# Patient Record
Sex: FEMALE | Race: WHITE | Employment: STUDENT | URBAN - METROPOLITAN AREA
[De-identification: names, ages, dates, MRNs, and addresses within clinical notes are randomized per-mention and may not be internally consistent; named-entity substitution may affect disease eponyms.]

---

## 2020-03-30 ENCOUNTER — TRANSFERRED RECORDS (OUTPATIENT)
Dept: HEALTH INFORMATION MANAGEMENT | Facility: CLINIC | Age: 20
End: 2020-03-30

## 2020-03-30 ENCOUNTER — MEDICAL CORRESPONDENCE (OUTPATIENT)
Dept: HEALTH INFORMATION MANAGEMENT | Facility: CLINIC | Age: 20
End: 2020-03-30

## 2021-03-10 ENCOUNTER — TELEPHONE (OUTPATIENT)
Dept: PLASTIC SURGERY | Facility: CLINIC | Age: 21
End: 2021-03-10

## 2021-03-10 DIAGNOSIS — F64.0 GENDER DYSPHORIA IN ADOLESCENT AND ADULT: Primary | ICD-10-CM

## 2021-03-10 NOTE — TELEPHONE ENCOUNTER
[facility] :  Care Coordination Note     SITUATION   Pt (Thiago, they/he) is a 20 year old adult who is receiving support for:  Care Team  .    BACKGROUND     Spoke to pt regarding scheduling consultation for top surgery. Discussed options, scheduled consultation for 10/8/21 with [facility].     ASSESSMENT     Surgery              [facility] Assessment  [facility] ([facility]) Enrollment: Enrolled  Patient has a therapist: Yes  Name of therapist: Ann ? at [facility]  Letter of support #1: Requested  Surgery being considered: Yes  Mastectomy: Yes    Pt reports:    No smoking  No diabetes  HRT since October 2020  No previous gender confirming surgeries  Sees Barb (unsure of last name) at [facility]      PLAN          Nursing Interventions:  [facility] assessment completed    Follow-up plan:    1. Obtain GRETA [place]

## 2021-03-26 ENCOUNTER — MEDICAL CORRESPONDENCE (OUTPATIENT)
Dept: HEALTH INFORMATION MANAGEMENT | Facility: CLINIC | Age: 21
End: 2021-03-26

## 2021-04-03 ENCOUNTER — HEALTH MAINTENANCE LETTER (OUTPATIENT)
Age: 21
End: 2021-04-03

## 2021-04-21 ENCOUNTER — TELEPHONE (OUTPATIENT)
Dept: PLASTIC SURGERY | Facility: CLINIC | Age: 21
End: 2021-04-21

## 2021-04-21 NOTE — TELEPHONE ENCOUNTER
Called pt regarding opening in Dr. Forrest's scheduled for top surgery consult 5/14/21. Pt asked to switch consult date to May date. Pt requested video visit due to being in school in [place].     Zoe [place]  
none

## 2021-04-22 NOTE — TELEPHONE ENCOUNTER
FUTURE VISIT INFORMATION      FUTURE VISIT INFORMATION:    Date: 5/14/21    Time: 9:00am    Location: [facility]  REFERRAL INFORMATION:    Referring provider:  self    Referring providers clinic: N/A    Reason for visit/diagnosis top consult    RECORDS REQUESTED FROM:       No recs to collect

## 2021-05-03 ENCOUNTER — TRANSFERRED RECORDS (OUTPATIENT)
Dept: HEALTH INFORMATION MANAGEMENT | Facility: CLINIC | Age: 21
End: 2021-05-03

## 2021-05-14 ENCOUNTER — PATIENT OUTREACH (OUTPATIENT)
Dept: PLASTIC SURGERY | Facility: CLINIC | Age: 21
End: 2021-05-14

## 2021-05-14 ENCOUNTER — TELEPHONE (OUTPATIENT)
Dept: PLASTIC SURGERY | Facility: CLINIC | Age: 21
End: 2021-05-14

## 2021-05-14 ENCOUNTER — PRE VISIT (OUTPATIENT)
Dept: PLASTIC SURGERY | Facility: CLINIC | Age: 21
End: 2021-05-14

## 2021-05-14 NOTE — PROGRESS NOTES
[facility]:  Care Coordination Note     SITUATION   Patient is a 20 year old who is receiving support for:  Clinic Care Coordination - Follow-up (Received top surgery letter)  .    BACKGROUND     Received top surgery letter from therapist, that is out of date. Therapist emailed requesting updated letter.     ASSESSMENT     Surgery              [facility] Assessment  [facility] ([facility]) Enrollment: Enrolled  Patient has a therapist: Yes  Name of therapist: Barb Dover at [facility] psychotherapy  Letter of support #1: Received  Letter #1 Date: 03/30/20  Surgery being considered: Yes  Mastectomy: Yes          PLAN          Nursing Interventions:   Reviewed letter of support for WPATH standards of care which is NOT adequate, date needs to be updated.     Follow-up plan:  Pt to reschedule consult since they did not  the phone call for their virtual appointment.        Mo Nath

## 2021-05-14 NOTE — TELEPHONE ENCOUNTER
[facility] Call Center    Phone Message    May a detailed message be left on voicemail: yes     Reason for Call: Other: Patient missed appt today, 5/14/21, due to time difference and would like to reschedule back to October 2021.  Please follow up with patient.  Thank you!     Action Taken: Message routed to:  [facility]    Travel Screening: Not Applicable

## 2021-05-17 NOTE — TELEPHONE ENCOUNTER
Called pt back, LVM, to call writer directly. Expressed unlikely able to find an opening in or before October 2021.     CARLOS Siddiqui  Transgender Care Coordinator

## 2021-07-23 ENCOUNTER — TELEPHONE (OUTPATIENT)
Dept: PLASTIC SURGERY | Facility: CLINIC | Age: 21
End: 2021-07-23

## 2021-07-23 NOTE — TELEPHONE ENCOUNTER
Writer called pt regarding Dr. Forrest leaving and appt cancellation. No answer, DOMINIC.     Zoe España

## 2021-08-26 ENCOUNTER — TELEPHONE (OUTPATIENT)
Dept: PLASTIC SURGERY | Facility: CLINIC | Age: 21
End: 2021-08-26

## 2021-08-26 NOTE — TELEPHONE ENCOUNTER
Writer called pt regarding scheduling with Dr. Piedra for top consult, explained to pt that virtual visit would need to be completed in [place]. Pt to check in with family, call back to schedule consult mid-October.     Zoe España

## 2021-09-18 ENCOUNTER — HEALTH MAINTENANCE LETTER (OUTPATIENT)
Age: 21
End: 2021-09-18

## 2022-04-30 ENCOUNTER — HEALTH MAINTENANCE LETTER (OUTPATIENT)
Age: 22
End: 2022-04-30

## 2022-11-20 ENCOUNTER — HEALTH MAINTENANCE LETTER (OUTPATIENT)
Age: 22
End: 2022-11-20

## 2023-06-01 ENCOUNTER — HEALTH MAINTENANCE LETTER (OUTPATIENT)
Age: 23
End: 2023-06-01

## 2024-06-16 ENCOUNTER — HEALTH MAINTENANCE LETTER (OUTPATIENT)
Age: 24
End: 2024-06-16